# Patient Record
Sex: MALE | Race: WHITE | Employment: STUDENT | URBAN - METROPOLITAN AREA
[De-identification: names, ages, dates, MRNs, and addresses within clinical notes are randomized per-mention and may not be internally consistent; named-entity substitution may affect disease eponyms.]

---
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
## 2022-04-04 ENCOUNTER — OFFICE VISIT (OUTPATIENT)
Dept: ORTHOPEDICS | Facility: CLINIC | Age: 22
End: 2022-04-04
Payer: COMMERCIAL

## 2022-04-04 ENCOUNTER — ANCILLARY PROCEDURE (OUTPATIENT)
Dept: GENERAL RADIOLOGY | Facility: CLINIC | Age: 22
End: 2022-04-04
Attending: FAMILY MEDICINE
Payer: COMMERCIAL

## 2022-04-04 VITALS — WEIGHT: 205 LBS | BODY MASS INDEX: 24.21 KG/M2 | HEIGHT: 77 IN

## 2022-04-04 DIAGNOSIS — M79.641 RIGHT HAND PAIN: Primary | ICD-10-CM

## 2022-04-04 DIAGNOSIS — M20.021: Primary | ICD-10-CM

## 2022-04-04 PROCEDURE — 99202 OFFICE O/P NEW SF 15 MIN: CPT | Performed by: FAMILY MEDICINE

## 2022-04-04 PROCEDURE — 73130 X-RAY EXAM OF HAND: CPT | Mod: RT | Performed by: RADIOLOGY

## 2022-04-04 NOTE — LETTER
Date:April 6, 2022      Patient was self referred, no letter generated. Do not send.        [facility] Health Information
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
## 2022-04-06 ENCOUNTER — THERAPY VISIT (OUTPATIENT)
Dept: OCCUPATIONAL THERAPY | Facility: CLINIC | Age: 22
End: 2022-04-06
Attending: FAMILY MEDICINE

## 2022-04-06 DIAGNOSIS — M20.021: ICD-10-CM

## 2022-04-06 DIAGNOSIS — M79.644 PAIN OF FINGER OF RIGHT HAND: ICD-10-CM

## 2022-04-06 PROCEDURE — 29086 APPLICATION CAST FINGER: CPT | Mod: GO | Performed by: OCCUPATIONAL THERAPIST

## 2022-04-06 PROCEDURE — 97165 OT EVAL LOW COMPLEX 30 MIN: CPT | Mod: GO | Performed by: OCCUPATIONAL THERAPIST

## 2022-04-06 NOTE — PROGRESS NOTES
Hand Therapy Initial Evaluation    Current Date:  4/6/2022    Diagnosis: Right ring finger Boutonniere deformity  DOI: About two weeks ago  Post: 0w 0d (since initiation of PIP extension casting)  Referring physician: Conor Mane MD    Precautions: None    Subjective:  Mauro Monteiro is a 21 year old male.    Patient reports symptoms of the right ring finger which occurred when his finger was jammed playing rugby. Since onset symptoms are unchanged. General health as reported by patient is excellent.  Pertinent medical history includes: None.  Medical allergies: None.  Surgical history: orthopedic: Left hand, ulnar nerve and artery laceration.  Medication history: None.    Occupational Profile Information:  Right hand dominant  Current occupation is a student at the [employer]  Job Tasks: Computer Work, Prolonged Sitting, Repetitive Tasks  Prior functional level:  No limitations  Mobility: No difficulty  Transportation: Drives  Barriers include: None  Leisure activities/hobbies: Rugby, plays on the [employer] team    Patient reports symptoms of pain, stiffness/loss of motion, weakness/loss of strength and edema  Patient reported occupational performance deficits: pushing, pulling, lifting, carrying and reaching  Special tests:  x-ray.  Previous treatment: Had been wearing stack splint since being evaluated by Sports Medicine      Functional Outcome Measure:   Upper Extremity Functional Index Score:  SCORE: 66/80   (A lower score indicates greater disability.)          Objective:  Pain Level (Scale 0-10)   4/6/2022   At Rest 1-2/10   With Use NT     Pain Description  Date 4/6/2022   Location ring finger   Pain Quality aching   Frequency intermittent     Pain is worst daytime   Exacerbated by  bumping the middle knuckle   Relieved by rest   Progression unchanged     Edema (Circumference measured in cm)   4/6/2022 4/6/2022   Ring Left Right   P1 6.3 6.8   PIP 6.0 6.7   P2 5.4 5.5     Sensation   WNL  throughout all nerve distributions; per patient report.    ROM  Ring Finger 4/6/2022   AROM (PROM) Right   MCP 0/40   PIP -28/80  Able to passively correct the PIP joint to 0 degrees   DIP +15/10     Strength  Contraindicated at this time.    Special Tests  Positive modified Richard's test. (A positive test indicates disruption of the central slip of the extensor tendon.)         Assessment:  Patient presents with symptoms consistent with diagnosis of the above condition,  with conservative intervention.     Patient's limitations or Problem List includes:  Pain, Decreased ROM/motion, Increased edema and Decreased  of the right ring finger which interferes with the patient's ability to perform Self Care Tasks (dressing, eating, bathing, hygiene/toileting), Work Tasks, Recreational Activities, Household Chores and Driving  as compared to previous level of function.    Rehab Potential:  Excellent - Return to full activity, no limitations    Patient will benefit from skilled Occupational Therapy to increase ROM and  strength and decrease pain and edema to return to previous activity level and resume normal daily tasks and to reach their rehab potential.    Barriers to Learning:  No barrier    Communication Issues:  Patient appears to be able to clearly communicate and understand verbal and written communication and follow directions correctly.    Chart Review: Chart Review    Identified Performance Deficits: bathing/showering, toileting, dressing, feeding, hygiene and grooming, driving and community mobility, home establishment and management, meal preparation and cleanup, shopping, school and leisure activities    Assessment of Occupational Performance:  1-3 Performance Deficits    Clinical Decision Making (Complexity): Low complexity    Treatment Explanation:  The following has been discussed with the patient:  RX ordered/plan of care  Anticipated outcomes  Possible risks and side effects    Plan:  Frequency:  1  X week, once daily  Duration:  for 3 weeks tapering to 2 X a month over 6 weeks    Treatment Plan:   Therapeutic Exercise:  AROM, AAROM, PROM and Blocking  Orthotic Fabrication: Finger-based PIP extension gutter or circumferential PIP extension cast    Discharge Plan:  Achieve all LTG.  Independent in home treatment program.  Reach maximal therapeutic benefit.    Home Exercise Program:  Cast, wear full-time  DIP blocking    Next Visit:  Change cast

## 2022-04-13 ENCOUNTER — THERAPY VISIT (OUTPATIENT)
Dept: OCCUPATIONAL THERAPY | Facility: CLINIC | Age: 22
End: 2022-04-13
Payer: COMMERCIAL

## 2022-04-13 DIAGNOSIS — M79.644 PAIN OF FINGER OF RIGHT HAND: ICD-10-CM

## 2022-04-13 DIAGNOSIS — M20.021: Primary | ICD-10-CM

## 2022-04-13 PROCEDURE — 29086 APPLICATION CAST FINGER: CPT | Mod: GO | Performed by: OCCUPATIONAL THERAPIST

## 2022-04-13 NOTE — PROGRESS NOTES
SOAP note objective information for 4/13/2022.  Please refer to the daily flowsheet for treatment today, total treatment time and time spent performing 1:1 timed codes.     Diagnosis: Right ring finger Boutonniere deformity  DOI: About two weeks ago  Post: 1w 0d (since initiation of PIP extension casting, started 4/6/22)  Referring physician: Conor Mane MD      Pain Level (Scale 0-10)   4/6/2022   At Rest 1-2/10   With Use NT     Pain Description  Date 4/6/2022   Location ring finger   Pain Quality aching   Frequency intermittent     Pain is worst daytime   Exacerbated by  bumping the middle knuckle   Relieved by rest   Progression unchanged     Edema (Circumference measured in cm)   4/6/2022 4/6/2022 4/13/2022     Ring Left Right Right   P1 6.3 6.8 6.9   PIP 6.0 6.7 6.8   P2 5.4 5.5 5.7     Sensation   WNL throughout all nerve distributions; per patient report.    ROM  Ring Finger 4/6/2022 4/13/2022   AROM (PROM) Right Right   MCP 0/40 0/NT   PIP -28/80  Able to passively correct the PIP joint to 0 degrees 0/NT   DIP +15/10 +10/NT       Home Exercise Program:  Cast, wear full-time  DIP blocking    Next Visit:  Change cast

## 2022-04-27 ENCOUNTER — THERAPY VISIT (OUTPATIENT)
Dept: OCCUPATIONAL THERAPY | Facility: CLINIC | Age: 22
End: 2022-04-27

## 2022-04-27 DIAGNOSIS — M20.021: ICD-10-CM

## 2022-04-27 DIAGNOSIS — M79.644 PAIN OF FINGER OF RIGHT HAND: Primary | ICD-10-CM

## 2022-04-27 PROCEDURE — 97763 ORTHC/PROSTC MGMT SBSQ ENC: CPT | Mod: GO | Performed by: OCCUPATIONAL THERAPIST

## 2022-04-27 PROCEDURE — 29086 APPLICATION CAST FINGER: CPT | Mod: GO | Performed by: OCCUPATIONAL THERAPIST

## 2022-04-27 NOTE — PROGRESS NOTES
SOAP note objective information for 4/27/2022.  Please refer to the daily flowsheet for treatment today, total treatment time and time spent performing 1:1 timed codes.     Diagnosis: Right ring finger Boutonniere deformity  DOI: About two weeks ago  Post: 3w 0d (since initiation of PIP extension casting, started 4/6/22)  Referring physician: Conor Mane MD      Pain Level (Scale 0-10)   4/6/2022 4/27/2022   At Rest 1-2/10 1-2/10 occasionally   With Use NT      Pain Description  Date 4/6/2022   Location ring finger   Pain Quality aching   Frequency intermittent     Pain is worst daytime   Exacerbated by  bumping the middle knuckle   Relieved by rest   Progression unchanged     Edema (Circumference measured in cm)   4/6/2022 4/6/2022 4/13/2022     Ring Left Right Right   P1 6.3 6.8 6.9   PIP 6.0 6.7 6.8   P2 5.4 5.5 5.7     Sensation   WNL throughout all nerve distributions; per patient report.    ROM  Ring Finger 4/6/2022 4/13/2022 4/27/2022   AROM (PROM) Right Right Right   MCP 0/40 0/NT 0/NT   PIP -28/80  Able to passively correct the PIP joint to 0 degrees 0/NT 0/NT   DIP +15/10 +10/NT +5/full ROM       Home Exercise Program:  Cast, wear full-time  DIP blocking    Next Visit:  Change cast

## 2022-05-03 ENCOUNTER — OFFICE VISIT (OUTPATIENT)
Dept: ORTHOPEDICS | Facility: CLINIC | Age: 22
End: 2022-05-03
Payer: COMMERCIAL

## 2022-05-03 VITALS — BODY MASS INDEX: 24.21 KG/M2 | HEIGHT: 77 IN | WEIGHT: 205 LBS

## 2022-05-03 DIAGNOSIS — M20.021: Primary | ICD-10-CM

## 2022-05-03 PROCEDURE — 99213 OFFICE O/P EST LOW 20 MIN: CPT | Performed by: FAMILY MEDICINE

## 2022-05-03 NOTE — LETTER
5/3/2022      RE: Mauro Monteiro  [address] [address]     Dear Colleague,    Thank you for referring your patient, Mauro Monteiro, to the [facility] [place]. Please see a copy of my visit note below.    S: fup Right ring finger Boutonniere deformity.  Hand Therapy initiation of PIP extension casting, started 4/6/22 (four weeks ago).  Case discussed with hand therapy.  He is making appropriate progress.    Injury: Pt notes playing rugby 3/19/22, six weeks ago, and after the game noticing pain in his finger; pt thought he had just jammed his finger but continued to have pain and swelling and the injury was limiting his ability to be able to travel and play with the team.     Right hand dominant  Pt a student at the [employer]      Exam: 3 views of the right hand dated 4/4/2022.     COMPARISON: None.     CLINICAL HISTORY: Hand pain.     FINDINGS: AP, oblique, and lateral views of the right hand were  obtained. The bones are well aligned. Joint spaces are  well-maintained. No fracture or dislocation.                                                                      IMPRESSION: No acute bone abnormality in the right hand.     ERIC ALANIS MD       PMH:  Active problem list:  Patient Active Problem List   Diagnosis     Pain of finger of right hand     Acquired boutonniere deformity, right       FH:  No family history on file.    SH:  Social History     Socioeconomic History     Marital status: Single     Spouse name: Not on file     Number of children: Not on file     Years of education: Not on file     Highest education level: Not on file   Occupational History     Not on file   Tobacco Use     Smoking status: Never Smoker     Smokeless tobacco: Never Used   Substance and Sexual Activity     Alcohol use: Not Currently     Drug use: Never     Sexual activity: Not on file   Other Topics Concern     Not on file   Social History Narrative     Not  on file     Social Determinants of Health     Financial Resource Strain: Not on file   Food Insecurity: Not on file   Transportation Needs: Not on file   Physical Activity: Not on file   Stress: Not on file   Social Connections: Not on file   Intimate Partner Violence: Not on file   Housing Stability: Not on file       MEDS:  See EMR, reviewed  ALL:  See EMR, reviewed    REVIEW OF SYSTEMS:  CONSTITUTIONAL:NEGATIVE for fever, chills, change in weight  INTEGUMENTARY/SKIN: NEGATIVE for worrisome rashes, moles or lesions  EYES: NEGATIVE for vision changes or irritation  ENT/MOUTH: NEGATIVE for ear, mouth and throat problems  RESP:NEGATIVE for significant cough or SOB  BREAST: NEGATIVE for masses, tenderness or discharge  CV: NEGATIVE for chest pain, palpitations or peripheral edema  GI: NEGATIVE for nausea, abdominal pain, heartburn, or change in bowel habits  :NEGATIVE for frequency, dysuria, or hematuria  :NEGATIVE for frequency, dysuria, or hematuria  NEURO: NEGATIVE for weakness, dizziness or paresthesias  ENDOCRINE: NEGATIVE for temperature intolerance, skin/hair changes  HEME/ALLERGY/IMMUNE: NEGATIVE for bleeding problems  PSYCHIATRIC: NEGATIVE for changes in mood or affect      Objective: Outside of his extension PIP cast he does appear to have an improved boutonniere deformity compared to his initial presentation.  He exhibits normal independent strength to flexion at the DIP PIP and MCP.  Nontender over the volar plate.  Overlying skin is intact.  Appropriate conversation affect.    Assessment: Acquired boutonniere deformity, right    Plan: He has his final hand therapy visit this Friday before he leaves to go home for the summer near [facility].  He understands that the usual casting procedure takes place for about 6 weeks.  So he has an additional 2 weeks of casting.  Hand therapy will start some independent flexion exercises at the next visit.  Patient indicates he is associated with a hand  therapist in [place] that he saw for his opposite hand in the past.  I asked him to have his parents help him locate the name of the therapist and secure a visit, as he will likely need an additional hand therapy visit approximately 3 to 4 weeks from now.  Patient indicates that he also has a sports medicine orthopedic provider that he is associated with in [place], and he will try to secure a follow-up visit with that provider about 4 weeks from now.  Patient was provided with copies of his initial x-ray report and hand therapy evaluation to bring to [place].  He had no further questions.                    Again, thank you for allowing me to participate in the care of your patient.      Sincerely,    Conor Mane MD

## 2022-05-03 NOTE — LETTER
Date:May 4, 2022      Patient was self referred, no letter generated. Do not send.        [facility] Health Information

## 2022-05-03 NOTE — PROGRESS NOTES
S: fup Right ring finger Boutonniere deformity.  Hand Therapy initiation of PIP extension casting, started 4/6/22 (four weeks ago).  Case discussed with hand therapy.  He is making appropriate progress.    Injury: Pt notes playing rugby 3/19/22, six weeks ago, and after the game noticing pain in his finger; pt thought he had just jammed his finger but continued to have pain and swelling and the injury was limiting his ability to be able to travel and play with the team.     Right hand dominant  Pt a student at the [employer]      Exam: 3 views of the right hand dated 4/4/2022.     COMPARISON: None.     CLINICAL HISTORY: Hand pain.     FINDINGS: AP, oblique, and lateral views of the right hand were  obtained. The bones are well aligned. Joint spaces are  well-maintained. No fracture or dislocation.                                                                      IMPRESSION: No acute bone abnormality in the right hand.     ERIC ALANIS MD       PMH:  Active problem list:  Patient Active Problem List   Diagnosis     Pain of finger of right hand     Acquired boutonniere deformity, right       FH:  No family history on file.    SH:  Social History     Socioeconomic History     Marital status: Single     Spouse name: Not on file     Number of children: Not on file     Years of education: Not on file     Highest education level: Not on file   Occupational History     Not on file   Tobacco Use     Smoking status: Never Smoker     Smokeless tobacco: Never Used   Substance and Sexual Activity     Alcohol use: Not Currently     Drug use: Never     Sexual activity: Not on file   Other Topics Concern     Not on file   Social History Narrative     Not on file     Social Determinants of Health     Financial Resource Strain: Not on file   Food Insecurity: Not on file   Transportation Needs: Not on file   Physical Activity: Not on file   Stress: Not on file   Social Connections: Not on file   Intimate Partner  Violence: Not on file   Housing Stability: Not on file       MEDS:  See EMR, reviewed  ALL:  See EMR, reviewed    REVIEW OF SYSTEMS:  CONSTITUTIONAL:NEGATIVE for fever, chills, change in weight  INTEGUMENTARY/SKIN: NEGATIVE for worrisome rashes, moles or lesions  EYES: NEGATIVE for vision changes or irritation  ENT/MOUTH: NEGATIVE for ear, mouth and throat problems  RESP:NEGATIVE for significant cough or SOB  BREAST: NEGATIVE for masses, tenderness or discharge  CV: NEGATIVE for chest pain, palpitations or peripheral edema  GI: NEGATIVE for nausea, abdominal pain, heartburn, or change in bowel habits  :NEGATIVE for frequency, dysuria, or hematuria  :NEGATIVE for frequency, dysuria, or hematuria  NEURO: NEGATIVE for weakness, dizziness or paresthesias  ENDOCRINE: NEGATIVE for temperature intolerance, skin/hair changes  HEME/ALLERGY/IMMUNE: NEGATIVE for bleeding problems  PSYCHIATRIC: NEGATIVE for changes in mood or affect      Objective: Outside of his extension PIP cast he does appear to have an improved boutonniere deformity compared to his initial presentation.  He exhibits normal independent strength to flexion at the DIP PIP and MCP.  Nontender over the volar plate.  Overlying skin is intact.  Appropriate conversation affect.    Assessment: Acquired boutonniere deformity, right    Plan: He has his final hand therapy visit this Friday before he leaves to go home for the summer near [facility] [place].  He understands that the usual casting procedure takes place for about 6 weeks.  So he has an additional 2 weeks of casting.  Hand therapy will start some independent flexion exercises at the next visit.  Patient indicates he is associated with a hand therapist in [place] that he saw for his opposite hand in the past.  I asked him to have his parents help him locate the name of the therapist and secure a visit, as he will likely need an additional hand therapy visit approximately 3 to 4 weeks from now.   Patient indicates that he also has a sports medicine orthopedic provider that he is associated with in [place], and he will try to secure a follow-up visit with that provider about 4 weeks from now.  Patient was provided with copies of his initial x-ray report and hand therapy evaluation to bring to [place].  He had no further questions.

## 2022-08-03 PROBLEM — M20.021: Status: RESOLVED | Noted: 2022-04-06 | Resolved: 2022-08-03

## 2022-08-03 PROBLEM — M79.644 PAIN OF FINGER OF RIGHT HAND: Status: RESOLVED | Noted: 2022-04-06 | Resolved: 2022-08-03
